# Patient Record
Sex: MALE | Race: WHITE | URBAN - METROPOLITAN AREA
[De-identification: names, ages, dates, MRNs, and addresses within clinical notes are randomized per-mention and may not be internally consistent; named-entity substitution may affect disease eponyms.]

---

## 2021-04-23 ENCOUNTER — OFFICE VISIT (OUTPATIENT)
Dept: URBAN - METROPOLITAN AREA CLINIC 26 | Facility: CLINIC | Age: 36
End: 2021-04-23
Payer: COMMERCIAL

## 2021-04-23 DIAGNOSIS — H01.009 BLEPHARITIS OF EYELID: ICD-10-CM

## 2021-04-23 DIAGNOSIS — H00.19 CHALAZION OF EYELID: Primary | ICD-10-CM

## 2021-04-23 PROCEDURE — 99203 OFFICE O/P NEW LOW 30 MIN: CPT | Performed by: OPTOMETRIST

## 2021-04-23 ASSESSMENT — INTRAOCULAR PRESSURE
OD: 12
OS: 13

## 2021-04-23 NOTE — IMPRESSION/PLAN
Impression: Chalazion of eyelid: H00.19. Plan: recommend warm compresses qid OD with lid massage. ok to schedule consult with oculoplastics if no resolution, ~1month.

## 2021-04-23 NOTE — IMPRESSION/PLAN
Impression: Blepharitis of eyelid: H01.009. Plan: recommend daily warm compresses followed by lid scrubs. at's qid OU. monitor.